# Patient Record
Sex: FEMALE | Race: WHITE | NOT HISPANIC OR LATINO | Employment: FULL TIME | URBAN - METROPOLITAN AREA
[De-identification: names, ages, dates, MRNs, and addresses within clinical notes are randomized per-mention and may not be internally consistent; named-entity substitution may affect disease eponyms.]

---

## 2017-12-20 ENCOUNTER — ALLSCRIPTS OFFICE VISIT (OUTPATIENT)
Dept: OTHER | Facility: OTHER | Age: 22
End: 2017-12-20

## 2017-12-20 DIAGNOSIS — R00.0 TACHYCARDIA: ICD-10-CM

## 2017-12-21 NOTE — PROGRESS NOTES
Assessment   1  Never a smoker   2  Screening for depression (V79 0) (Z13 89)   3  Pap smear for cervical cancer screening (V76 2) (Z12 4)   4  Tachyarrhythmia (785 0) (R00 0)    Plan   Flu vaccine need    · Stop: Fluzone Quadrivalent 0 5 ML Intramuscular Suspension  Screening for depression    · *VB-Depression Screening; Status:Complete - Retrospective Authorization;   Done:    [id number] 01:20PM  Tachyarrhythmia    · BusPIRone HCl - 10 MG Oral Tablet; TAKE 1 TABLET Twice daily   · (1) T3 TOTAL; Status:Active; Requested for:39Sui1075;    · (1) T4, FREE; Status:Active; Requested for:95Xpl2425;    · (1) TSH; Status:Active; Requested for:11Olf4962;     Discussion/Summary      Patient will be started on some buspirone will reassess her in 2 weeks we will also pickup thyroid functions  Chief Complaint   Jeanie Bojorquez is here today with a chief complaint that she has been experiencing rapid heartbeat tachyarrhythmias and anxiety this actually got worse while she was studying [place] but even what it was even present prior to her going [place] to [place] she had this issue during [address] on has not ever had her thyroid checked on other than that has no medical illnesses that should predispose her to tachyarrhythmias      History of Present Illness   see chief complaint      Review of Systems        Constitutional: No fever, no chills, feels well, no tiredness, no recent weight gain or weight loss  Eyes: No complaints of eye pain, no red eyes, no eyesight problems, no discharge, no dry eyes, no itching of eyes  ENT: no complaints of earache, no loss of hearing, no nose bleeds, no nasal discharge, no sore throat, no hoarseness  Cardiovascular: tachyarrhythmias  Respiratory: No complaints of shortness of breath, no wheezing, no cough, no SOB on exertion, no orthopnea, no PND  Gastrointestinal: No complaints of abdominal pain, no constipation, no nausea or vomiting, no diarrhea, no bloody stools  Genitourinary: No complaints of dysuria, no incontinence, no pelvic pain, no dysmenorrhea, no vaginal discharge or bleeding  Musculoskeletal: No complaints of arthralgias, no myalgias, no joint swelling or stiffness, no limb pain or swelling  Integumentary: No complaints of skin rash or lesions, no itching, no skin wounds, no breast pain or lump  Neurological: No complaints of headache, no confusion, no convulsions, no numbness, no dizziness or fainting, no tingling, no limb weakness, no difficulty walking  Psychiatric: Not suicidal, no sleep disturbance, no anxiety or depression, no change in personality, no emotional problems  Endocrine: No complaints of proptosis, no hot flashes, no muscle weakness, no deepening of the voice, no feelings of weakness  Hematologic/Lymphatic: No complaints of swollen glands, no swollen glands in the neck, does not bleed easily, does not bruise easily  ROS reviewed  Active Problems   1  Allergic to peanuts (V15 01) (Z91 010)   2  Cardiomyopathy, hypertrophic obstructive (425 11) (I42 1)   3  Flu vaccine need (V04 81) (Z23)   4  Pap smear for cervical cancer screening (V76 2) (Z12 4)   5  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)   6  Screening for depression (V79 0) (Z13 89)   7  Tinea pedis (110 4) (B35 3)    Past Medical History   1  History of Abdominal pain, RUQ (789 01) (R10 11)   2  History of sinusitis (V12 69) (Z87 09)     The active problems and past medical history were reviewed and updated today  Surgical History   1  History of Decompression Fasciotomy Of The Thigh Adductor Compartment   2  History of Leg Decompression Fasciotomy   3  History of Oral Surgery Tooth Extraction     The surgical history was reviewed and updated today  Family History   Mother    1  Family history of Anemia   2  Family history of Asthma  Father    3  Family history of asthma (V17 5) (Z82 5)  Grandparent    4   Family history of Heart attack   5  Family history of High blood pressure     The family history was reviewed and updated today  Social History    · Being A Social Drinker   · Drug use (305 90) (F19 90)   · Never a smoker  The social history was reviewed and updated today  The social history was reviewed and is unchanged  Current Meds    1  EpiPen 2-[place] 0 3 MG/0 3ML Injection Solution Auto-injector; 1 injectioned IM for allergic     reaction; Therapy: [id number] to (Last Tracey Heading)  Requested for: 60Umi3968 Ordered     The medication list was reviewed and updated today  Allergies   1  Sesame Oil OIL    Vitals   Vital Signs    Recorded: [id number] 24:61ZV   Systolic 881, LUE, Sitting   Diastolic 72, LUE, Sitting   Height 5 ft 4 5 in   Weight 223 lb    BMI Calculated 37 69   BSA Calculated 2 06     Physical Exam        Constitutional      General appearance: No acute distress, well appearing and well nourished  Eyes      Conjunctiva and lids: No swelling, erythema or discharge  Pupils and irises: Equal, round and reactive to light  Ears, Nose, Mouth, and Throat      External inspection of ears and nose: Normal        Otoscopic examination: Tympanic membranes translucent with normal light reflex  Canals patent without erythema  Nasal mucosa, septum, and turbinates: Normal without edema or erythema  Oropharynx: Normal with no erythema, edema, exudate or lesions  Pulmonary      Respiratory effort: No increased work of breathing or signs of respiratory distress  Auscultation of lungs: Clear to auscultation  Cardiovascular      Palpation of heart: Normal PMI, no thrills  Auscultation of heart: Normal rate and rhythm, normal S1 and S2, without murmurs  Examination of extremities for edema and/or varicosities: Normal        Carotid pulses: Normal        Abdomen      Abdomen: Non-tender, no masses  Liver and spleen: No hepatomegaly or splenomegaly  Lymphatic      Palpation of lymph nodes in neck: No lymphadenopathy  Musculoskeletal      Gait and station: Normal        Digits and nails: Normal without clubbing or cyanosis  Inspection/palpation of joints, bones, and muscles: Normal        Skin      Skin and subcutaneous tissue: Normal without rashes or lesions  Neurologic      Cranial nerves: Cranial nerves 2-12 intact  Reflexes: 2+ and symmetric  Sensation: No sensory loss         Psychiatric      Orientation to person, place, and time: Normal        Mood and affect: Normal           Results/Data   *VB-Depression Screening [id number] 01:20PM Andrew Distad      Test Name Result Flag Reference   Depression Scale Result      Depression Screen - Negative For Symptoms        Signatures    Electronically signed by : Nir Bartlett DO; Dec 20 2017  1:35PM EST                       (Author)

## 2018-01-04 ENCOUNTER — GENERIC CONVERSION - ENCOUNTER (OUTPATIENT)
Dept: OTHER | Facility: OTHER | Age: 23
End: 2018-01-04

## 2018-01-22 VITALS
WEIGHT: 223 LBS | HEIGHT: 65 IN | SYSTOLIC BLOOD PRESSURE: 140 MMHG | BODY MASS INDEX: 37.15 KG/M2 | DIASTOLIC BLOOD PRESSURE: 72 MMHG

## 2018-01-24 VITALS
DIASTOLIC BLOOD PRESSURE: 78 MMHG | WEIGHT: 227 LBS | BODY MASS INDEX: 37.82 KG/M2 | SYSTOLIC BLOOD PRESSURE: 112 MMHG | HEIGHT: 65 IN

## 2018-02-06 DIAGNOSIS — F41.9 ANXIETY: Primary | ICD-10-CM

## 2018-02-06 RX ORDER — BUSPIRONE HYDROCHLORIDE 10 MG/1
1 TABLET ORAL 2 TIMES DAILY
COMMUNITY
Start: 2017-12-20 | End: 2018-02-06 | Stop reason: SDUPTHER

## 2018-02-06 RX ORDER — EPINEPHRINE 0.3 MG/.3ML
INJECTION SUBCUTANEOUS
COMMUNITY
Start: 2015-08-26

## 2018-02-06 RX ORDER — BUSPIRONE HYDROCHLORIDE 10 MG/1
10 TABLET ORAL 2 TIMES DAILY
Qty: 180 TABLET | Refills: 0 | Status: SHIPPED | OUTPATIENT
Start: 2018-02-06 | End: 2019-08-22

## 2018-02-06 NOTE — TELEPHONE ENCOUNTER
Pt is going away for 3 months to [facility] and would like a 90 day supply of her Buspirone to cover her while she is gone

## 2019-08-22 ENCOUNTER — OFFICE VISIT (OUTPATIENT)
Dept: FAMILY MEDICINE CLINIC | Facility: CLINIC | Age: 24
End: 2019-08-22
Payer: COMMERCIAL

## 2019-08-22 VITALS
SYSTOLIC BLOOD PRESSURE: 134 MMHG | BODY MASS INDEX: 35.01 KG/M2 | HEART RATE: 83 BPM | OXYGEN SATURATION: 97 % | WEIGHT: 197.6 LBS | HEIGHT: 63 IN | DIASTOLIC BLOOD PRESSURE: 76 MMHG

## 2019-08-22 DIAGNOSIS — Z11.1 SCREENING FOR TUBERCULOSIS: ICD-10-CM

## 2019-08-22 DIAGNOSIS — Z00.00 ANNUAL PHYSICAL EXAM: Primary | ICD-10-CM

## 2019-08-22 DIAGNOSIS — Z01.84 IMMUNITY STATUS TESTING: ICD-10-CM

## 2019-08-22 DIAGNOSIS — Z13.9 SCREENING FOR UNSPECIFIED CONDITION: ICD-10-CM

## 2019-08-22 DIAGNOSIS — R00.0 TACHYARRHYTHMIA: ICD-10-CM

## 2019-08-22 DIAGNOSIS — I42.1 CARDIOMYOPATHY, HYPERTROPHIC OBSTRUCTIVE (HCC): ICD-10-CM

## 2019-08-22 DIAGNOSIS — Z11.59 NEED FOR HEPATITIS C SCREENING TEST: ICD-10-CM

## 2019-08-22 PROCEDURE — 99395 PREV VISIT EST AGE 18-39: CPT | Performed by: PHYSICIAN ASSISTANT

## 2019-08-22 NOTE — PROGRESS NOTES
ADULT ANNUAL PHYSICAL  [facility] - [id number] FAMILY PRACTICE    NAME: Bill Trinh  AGE: 21 y o  SEX: female  : 1995     DATE: 2019     Assessment and Plan:     Problem List Items Addressed This Visit        Cardiovascular and Mediastinum    RESOLVED: Cardiomyopathy, hypertrophic obstructive ([address] )    RESOLVED: Tachyarrhythmia      Other Visit Diagnoses     Annual physical exam    -  Primary    Relevant Orders    Visual acuity screening (Completed)    BMI 35 0-35 9,adult        Immunity status testing        Relevant Orders    Hepatitis B surface antibody    Hepatitis B surface antigen    Screening for tuberculosis        Relevant Orders    Quantiferon TB Gold Plus    Need for hepatitis C screening test        Relevant Orders    Hepatitis C antibody    Screening for unspecified condition        Relevant Orders    HIV 1/2 AG-AB combo    CBC and differential    Hepatitis C antibody    Comprehensive metabolic panel    TSH, 3rd generation    Lipid panel          Immunizations and preventive care screenings were discussed with patient today  Appropriate education was printed on patient's after visit summary  Counseling:  BMI Counseling: Body mass index is 35 kg/m²  Discussed the patient's BMI with her  The BMI is above average  BMI counseling and education was provided to the patient  Nutrition recommendations include decreasing overall calorie intake and 3-5 servings of fruits/vegetables daily  Exercise recommendations include exercising 3-5 times per week  · Dental Health: discussed importance of regular tooth brushing, flossing, and dental visits  Return in about 1 year (around 2020) for Annual physical      Chief Complaint:     Chief Complaint   Patient presents with    Annual Exam     Physical for [employer]      History of Present Illness:     Adult Annual Physical   Patient here for a comprehensive physical exam in order to join the [employer]   She plans to teach English to students in [facility]  She does not have any known medical problems  In 2014 she had a stress echocardiogram because of a strong family history of early cardiac death and heart disease  Her echocardiogram did not reveal any abnormalities  She exercises regularly, and denies any chest pains or shortness of breath  She did struggle with anxiety for a short period of time and was prescribed Buspar  She has not been taking it for over one year  She denies any suicidal or homicidal thoughts  She does plan to get established with a gynecologist prior to leaving for [facility]  She denies any menstrual abnormalities  She denies any pain or heavy bleeding  The patient reports no problems  Diet and Physical Activity  · Diet/Nutrition: well balanced diet  · Exercise: moderate cardiovascular exercise  Depression Screening  PHQ-9 Depression Screening    PHQ-9:    Frequency of the following problems over the past two weeks:       Little interest or pleasure in doing things:  0 - not at all  Feeling down, depressed, or hopeless:  0 - not at all  PHQ-2 Score:  0       General Health  · Sleep: sleeps well  · Hearing: normal - bilateral   · Vision: no vision problems, goes for regular eye exams and wears glasses  · Dental: regular dental visits  /GYN Health  · Plans to get established with an gynecologist  Will need updated PAP smear  Review of Systems:     Review of Systems   Constitutional: Negative for chills, diaphoresis, fatigue and fever  HENT: Negative for congestion, ear pain, postnasal drip, rhinorrhea, sneezing, sore throat and trouble swallowing  Eyes: Negative for pain and visual disturbance  Respiratory: Negative for apnea, cough, shortness of breath and wheezing  Cardiovascular: Negative for chest pain and palpitations  Gastrointestinal: Negative for abdominal pain, constipation, diarrhea, nausea and vomiting     Genitourinary: Negative for dysuria, hematuria and menstrual problem  Musculoskeletal: Negative for arthralgias, gait problem and myalgias  Neurological: Negative for dizziness, syncope, weakness, light-headedness, numbness and headaches  Psychiatric/Behavioral: Negative for suicidal ideas  The patient is not nervous/anxious  Past Medical History:     History reviewed  No pertinent past medical history     Past Surgical History:     Past Surgical History:   Procedure Laterality Date    DECOMPRESSION FASCIOTOMY LEG Bilateral     calves    TOOTH EXTRACTION        Social History:     Social History     Socioeconomic History    Marital status: Single     Spouse name: None    Number of children: None    Years of education: None    Highest education level: None   Occupational History    None   Social Needs    Financial resource strain: None    Food insecurity:     Worry: None     Inability: None    Transportation needs:     Medical: None     Non-medical: None   Tobacco Use    Smoking status: Former Smoker    Smokeless tobacco: Never Used   Substance and Sexual Activity    Alcohol use: Yes     Comment: being a social drinker    Drug use: Yes     Types: Marijuana     Comment: rare    Sexual activity: None   Lifestyle    Physical activity:     Days per week: None     Minutes per session: None    Stress: None   Relationships    Social connections:     Talks on phone: None     Gets together: None     Attends Restorationist service: None     Active member of club or organization: None     Attends meetings of clubs or organizations: None     Relationship status: None    Intimate partner violence:     Fear of current or ex partner: None     Emotionally abused: None     Physically abused: None     Forced sexual activity: None   Other Topics Concern    None   Social History Narrative    None      Family History:     Family History   Problem Relation Age of Onset    Anemia Mother     Asthma Mother     Asthma Father     Heart attack Other     Hypertension Other high blood pressure      Current Medications:     Current Outpatient Medications   Medication Sig Dispense Refill    EPINEPHrine (EPIPEN 2-JESSICA) 0 3 mg/0 3 mL SOAJ Inject as directed       No current facility-administered medications for this visit  Allergies: Allergies   Allergen Reactions    Peanut (Diagnostic)     Sesame Oil      Other reaction(s): Itching      Physical Exam:     /76   Pulse 83   Ht 5' 3" (1 6 m)   Wt 89 6 kg (197 lb 9 6 oz)   SpO2 97%   BMI 35 00 kg/m²     Physical Exam   Constitutional: She is oriented to person, place, and time  She appears well-developed and well-nourished  HENT:   Head: Normocephalic and atraumatic  Right Ear: Tympanic membrane, external ear and ear canal normal    Left Ear: Tympanic membrane, external ear and ear canal normal    Nose: Nose normal    Mouth/Throat: Oropharynx is clear and moist and mucous membranes are normal  No oropharyngeal exudate, posterior oropharyngeal edema or posterior oropharyngeal erythema  Eyes: Pupils are equal, round, and reactive to light  EOM are normal    Neck: Normal range of motion  Neck supple  Cardiovascular: Normal rate, regular rhythm and normal heart sounds  Exam reveals no gallop and no friction rub  No murmur heard  Pulmonary/Chest: Effort normal and breath sounds normal  No respiratory distress  She has no wheezes  She has no rales  Abdominal: Soft  Bowel sounds are normal  There is no tenderness  There is no rebound and no guarding  Musculoskeletal: Normal range of motion  Lymphadenopathy:     She has no cervical adenopathy  Neurological: She is alert and oriented to person, place, and time  Skin: Skin is warm and dry  Psychiatric: She has a normal mood and affect  Her behavior is normal  Judgment and thought content normal    Vitals reviewed        Baron Deidre PA-C   [address] [facility]

## 2021-03-30 ENCOUNTER — TELEPHONE (OUTPATIENT)
Dept: FAMILY MEDICINE CLINIC | Facility: CLINIC | Age: 26
End: 2021-03-30

## 2023-03-02 NOTE — PATIENT INSTRUCTIONS
